# Patient Record
Sex: FEMALE | Race: WHITE | NOT HISPANIC OR LATINO | URBAN - METROPOLITAN AREA
[De-identification: names, ages, dates, MRNs, and addresses within clinical notes are randomized per-mention and may not be internally consistent; named-entity substitution may affect disease eponyms.]

---

## 2023-10-17 ENCOUNTER — TELEPHONE (OUTPATIENT)
Dept: BEHAVIORAL HEALTH | Facility: CLINIC | Age: 43
End: 2023-10-17

## 2023-12-04 ENCOUNTER — TELEPHONE (OUTPATIENT)
Dept: BEHAVIORAL HEALTH | Facility: CLINIC | Age: 43
End: 2023-12-04

## 2023-12-04 ENCOUNTER — DOCUMENTATION (OUTPATIENT)
Dept: BEHAVIORAL HEALTH | Facility: CLINIC | Age: 43
End: 2023-12-04

## 2023-12-04 DIAGNOSIS — F32.A DEPRESSION, UNSPECIFIED DEPRESSION TYPE: ICD-10-CM

## 2023-12-04 DIAGNOSIS — F43.10 PTSD (POST-TRAUMATIC STRESS DISORDER): ICD-10-CM

## 2023-12-04 DIAGNOSIS — F39 UNSPECIFIED MOOD (AFFECTIVE) DISORDER (CMS-HCC): ICD-10-CM

## 2023-12-04 RX ORDER — PRAZOSIN HYDROCHLORIDE 1 MG/1
1 CAPSULE ORAL NIGHTLY
Qty: 90 CAPSULE | Refills: 0 | Status: SHIPPED | OUTPATIENT
Start: 2023-12-04 | End: 2024-01-04 | Stop reason: SDUPTHER

## 2023-12-04 RX ORDER — ESCITALOPRAM OXALATE 20 MG/1
20 TABLET ORAL DAILY
COMMUNITY
End: 2023-12-04 | Stop reason: SDUPTHER

## 2023-12-04 RX ORDER — LAMOTRIGINE 100 MG/1
1 TABLET ORAL 2 TIMES DAILY
COMMUNITY
End: 2023-12-04 | Stop reason: SDUPTHER

## 2023-12-04 RX ORDER — ESCITALOPRAM OXALATE 20 MG/1
20 TABLET ORAL DAILY
Qty: 90 TABLET | Refills: 0 | Status: SHIPPED | OUTPATIENT
Start: 2023-12-04 | End: 2024-01-04 | Stop reason: SDUPTHER

## 2023-12-04 RX ORDER — ESCITALOPRAM OXALATE 20 MG/1
20 TABLET ORAL DAILY
Status: CANCELLED
Start: 2023-12-04

## 2023-12-04 RX ORDER — LAMOTRIGINE 100 MG/1
100 TABLET ORAL 2 TIMES DAILY
Qty: 180 TABLET | Refills: 0 | Status: SHIPPED | OUTPATIENT
Start: 2023-12-04 | End: 2024-01-04 | Stop reason: SDUPTHER

## 2023-12-04 NOTE — PROGRESS NOTES
Non billable note : patient called and requested refills on lamotrigine and prazosin and escitalopram . Sent med orders after chart revew [facility]

## 2024-01-01 PROBLEM — R87.619 ABNORMAL PAP SMEAR OF CERVIX: Status: ACTIVE | Noted: 2024-01-01

## 2024-01-01 PROBLEM — F43.10 PTSD (POST-TRAUMATIC STRESS DISORDER): Status: ACTIVE | Noted: 2024-01-01

## 2024-01-01 PROBLEM — Z98.84 BARIATRIC SURGERY STATUS: Status: ACTIVE | Noted: 2024-01-01

## 2024-01-01 PROBLEM — E55.9 VITAMIN D DEFICIENCY: Status: ACTIVE | Noted: 2024-01-01

## 2024-01-01 PROBLEM — G47.19 EXCESSIVE DAYTIME SLEEPINESS: Status: ACTIVE | Noted: 2024-01-01

## 2024-01-01 PROBLEM — F32.A DEPRESSION: Status: ACTIVE | Noted: 2024-01-01

## 2024-01-01 PROBLEM — R10.9 ABDOMINAL PAIN: Status: ACTIVE | Noted: 2024-01-01

## 2024-01-01 PROBLEM — R74.8 LOW SERUM HDL: Status: ACTIVE | Noted: 2024-01-01

## 2024-01-01 PROBLEM — F39 MOOD DISORDER (CMS-HCC): Status: ACTIVE | Noted: 2024-01-01

## 2024-01-01 PROBLEM — T50.A95A LOCAL REACTION TO TETANUS VACCINE: Status: ACTIVE | Noted: 2024-01-01

## 2024-01-01 PROBLEM — R79.89 ABNORMAL THYROID BLOOD TEST: Status: ACTIVE | Noted: 2024-01-01

## 2024-01-01 PROBLEM — F41.9 ANXIETY: Status: ACTIVE | Noted: 2024-01-01

## 2024-01-01 PROBLEM — R19.5 CHANGE IN STOOL: Status: ACTIVE | Noted: 2024-01-01

## 2024-01-01 PROBLEM — K62.5 RECTAL BLEEDING: Status: ACTIVE | Noted: 2024-01-01

## 2024-01-01 PROBLEM — E66.01 MORBID OBESITY (MULTI): Status: ACTIVE | Noted: 2024-01-01

## 2024-01-01 PROBLEM — J01.90 ACUTE SINUS INFECTION: Status: ACTIVE | Noted: 2024-01-01

## 2024-01-01 RX ORDER — BUPROPION HYDROCHLORIDE 300 MG/1
300 TABLET ORAL
COMMUNITY
End: 2024-01-04 | Stop reason: SDUPTHER

## 2024-01-01 RX ORDER — AMLODIPINE BESYLATE 5 MG/1
5 TABLET ORAL DAILY
COMMUNITY

## 2024-01-01 RX ORDER — PRAZOSIN HYDROCHLORIDE 1 MG/1
CAPSULE ORAL
COMMUNITY
Start: 2021-04-09 | End: 2024-05-30 | Stop reason: SDUPTHER

## 2024-01-01 RX ORDER — CYCLOBENZAPRINE HCL 10 MG
TABLET ORAL
COMMUNITY
Start: 2023-06-26 | End: 2024-05-30 | Stop reason: WASHOUT

## 2024-01-01 RX ORDER — LIDOCAINE 560 MG/1
PATCH PERCUTANEOUS; TOPICAL; TRANSDERMAL
COMMUNITY
Start: 2022-06-26 | End: 2024-05-30 | Stop reason: WASHOUT

## 2024-01-01 RX ORDER — BACLOFEN 10 MG/1
10 TABLET ORAL EVERY 12 HOURS PRN
COMMUNITY
Start: 2022-06-29

## 2024-01-01 RX ORDER — ERYTHROMYCIN 5 MG/G
OINTMENT OPHTHALMIC
COMMUNITY
Start: 2022-06-05 | End: 2024-05-30 | Stop reason: WASHOUT

## 2024-01-01 RX ORDER — DIAZEPAM 5 MG/1
TABLET ORAL
COMMUNITY
Start: 2022-06-29 | End: 2024-05-30 | Stop reason: WASHOUT

## 2024-01-01 RX ORDER — PREGABALIN 75 MG/1
75 CAPSULE ORAL 3 TIMES DAILY
COMMUNITY
Start: 2022-07-11 | End: 2024-03-14

## 2024-01-01 RX ORDER — ETODOLAC 400 MG/1
400 TABLET, FILM COATED ORAL 2 TIMES DAILY
COMMUNITY
Start: 2023-11-28

## 2024-01-04 ENCOUNTER — TELEMEDICINE (OUTPATIENT)
Dept: BEHAVIORAL HEALTH | Facility: CLINIC | Age: 44
End: 2024-01-04
Payer: COMMERCIAL

## 2024-01-04 DIAGNOSIS — F43.10 PTSD (POST-TRAUMATIC STRESS DISORDER): ICD-10-CM

## 2024-01-04 DIAGNOSIS — F32.A DEPRESSION, UNSPECIFIED DEPRESSION TYPE: ICD-10-CM

## 2024-01-04 DIAGNOSIS — F41.1 GAD (GENERALIZED ANXIETY DISORDER): ICD-10-CM

## 2024-01-04 DIAGNOSIS — F39 UNSPECIFIED MOOD (AFFECTIVE) DISORDER (CMS-HCC): ICD-10-CM

## 2024-01-04 PROCEDURE — 99214 OFFICE O/P EST MOD 30 MIN: CPT | Performed by: CLINICAL NURSE SPECIALIST

## 2024-01-04 RX ORDER — BUPROPION HYDROCHLORIDE 300 MG/1
300 TABLET ORAL
Qty: 90 TABLET | Refills: 2 | Status: SHIPPED | OUTPATIENT
Start: 2024-01-04 | End: 2024-05-30 | Stop reason: SDUPTHER

## 2024-01-04 RX ORDER — PRAZOSIN HYDROCHLORIDE 1 MG/1
1 CAPSULE ORAL NIGHTLY
Qty: 90 CAPSULE | Refills: 2 | Status: SHIPPED | OUTPATIENT
Start: 2024-01-04 | End: 2024-05-30 | Stop reason: SDUPTHER

## 2024-01-04 RX ORDER — LAMOTRIGINE 100 MG/1
100 TABLET ORAL 2 TIMES DAILY
Qty: 180 TABLET | Refills: 2 | Status: SHIPPED | OUTPATIENT
Start: 2024-01-04 | End: 2024-05-30 | Stop reason: SDUPTHER

## 2024-01-04 RX ORDER — BUSPIRONE HYDROCHLORIDE 5 MG/1
5 TABLET ORAL 2 TIMES DAILY
Qty: 180 TABLET | Refills: 2 | Status: SHIPPED | OUTPATIENT
Start: 2024-01-04 | End: 2024-05-30 | Stop reason: SDUPTHER

## 2024-01-04 RX ORDER — ESCITALOPRAM OXALATE 20 MG/1
20 TABLET ORAL DAILY
Qty: 90 TABLET | Refills: 2 | Status: SHIPPED | OUTPATIENT
Start: 2024-01-04 | End: 2024-05-30 | Stop reason: SDUPTHER
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
## 2024-02-01 ENCOUNTER — TELEMEDICINE (OUTPATIENT)
Dept: BEHAVIORAL HEALTH | Facility: CLINIC | Age: 44
End: 2024-02-01
Payer: COMMERCIAL

## 2024-02-01 DIAGNOSIS — F43.10 PTSD (POST-TRAUMATIC STRESS DISORDER): ICD-10-CM

## 2024-02-01 DIAGNOSIS — F41.9 ANXIETY: ICD-10-CM

## 2024-02-01 DIAGNOSIS — F32.A DEPRESSION, UNSPECIFIED DEPRESSION TYPE: ICD-10-CM

## 2024-02-01 DIAGNOSIS — F39 MOOD DISORDER (CMS-HCC): ICD-10-CM

## 2024-02-01 PROCEDURE — 99213 OFFICE O/P EST LOW 20 MIN: CPT | Performed by: CLINICAL NURSE SPECIALIST
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
## 2024-05-30 ENCOUNTER — TELEMEDICINE (OUTPATIENT)
Dept: BEHAVIORAL HEALTH | Facility: CLINIC | Age: 44
End: 2024-05-30
Payer: COMMERCIAL

## 2024-05-30 DIAGNOSIS — F39 UNSPECIFIED MOOD (AFFECTIVE) DISORDER (CMS-HCC): ICD-10-CM

## 2024-05-30 DIAGNOSIS — F41.1 GAD (GENERALIZED ANXIETY DISORDER): ICD-10-CM

## 2024-05-30 DIAGNOSIS — F39 MOOD DISORDER (CMS-HCC): ICD-10-CM

## 2024-05-30 DIAGNOSIS — F41.1 GENERALIZED ANXIETY DISORDER: ICD-10-CM

## 2024-05-30 DIAGNOSIS — F32.A DEPRESSION, UNSPECIFIED DEPRESSION TYPE: ICD-10-CM

## 2024-05-30 DIAGNOSIS — F43.10 PTSD (POST-TRAUMATIC STRESS DISORDER): ICD-10-CM

## 2024-05-30 DIAGNOSIS — F41.9 ANXIETY: ICD-10-CM

## 2024-05-30 PROCEDURE — 99214 OFFICE O/P EST MOD 30 MIN: CPT | Performed by: CLINICAL NURSE SPECIALIST

## 2024-05-30 RX ORDER — BUSPIRONE HYDROCHLORIDE 5 MG/1
5 TABLET ORAL 2 TIMES DAILY
Qty: 180 TABLET | Refills: 1 | Status: SHIPPED | OUTPATIENT
Start: 2024-05-30 | End: 2024-08-28

## 2024-05-30 RX ORDER — PRAZOSIN HYDROCHLORIDE 1 MG/1
1 CAPSULE ORAL NIGHTLY
Qty: 90 CAPSULE | Refills: 1 | Status: SHIPPED | OUTPATIENT
Start: 2024-05-30 | End: 2024-08-28

## 2024-05-30 RX ORDER — PRAZOSIN HYDROCHLORIDE 1 MG/1
1 CAPSULE ORAL NIGHTLY
Qty: 30 CAPSULE | Refills: 1 | Status: SHIPPED | OUTPATIENT
Start: 2024-05-30 | End: 2024-08-28

## 2024-05-30 RX ORDER — BUPROPION HYDROCHLORIDE 300 MG/1
300 TABLET ORAL
Qty: 90 TABLET | Refills: 1 | Status: SHIPPED | OUTPATIENT
Start: 2024-05-30 | End: 2024-08-28

## 2024-05-30 RX ORDER — LAMOTRIGINE 100 MG/1
100 TABLET ORAL 2 TIMES DAILY
Qty: 180 TABLET | Refills: 1 | Status: SHIPPED | OUTPATIENT
Start: 2024-05-30 | End: 2024-08-28

## 2024-05-30 RX ORDER — ESCITALOPRAM OXALATE 20 MG/1
20 TABLET ORAL DAILY
Qty: 90 TABLET | Refills: 1 | Status: SHIPPED | OUTPATIENT
Start: 2024-05-30 | End: 2024-08-28

## 2024-05-30 NOTE — PROGRESS NOTES
Outpatient Psychiatry      Subjective     Alejandra Connell, a 43 y.o. female presents as an established patient for a virtual appointment as an established patient for medication managent /outpatient psychiatry     Diagnosis:        Anxiety (300.00) (F41.9)   Depression (311) (F32.A)   Mood disorder (296.90) (F39)   PTSD (post-traumatic stress disorder) (309.81) (F43.10)       Treatment Plan/Recommendations: can adhere to 10-12 weeks and maintain appointments with other medical providers , can call  for treatment concerns   Follow-up plan was discussed with patient.    Review with patient: Treatment plan reviewed with the patient.  Medication risks/benefit reviewed with the patient    HPI:  She has had a stable mood and she can manage her anxiety   [facility] therapist del[facility] ([facility] ) , and this has been helpful    She is working      She has osteoarthritis   She is on leave for work right now   She is able to express her thoughts and feelings , coping skills and insight are good   Support provided       Record Review: brief     Vitals:  There were no vitals filed for this visit.    Johanna Russo, APRN-CNS

## 2024-06-13 PROBLEM — F41.1 GAD (GENERALIZED ANXIETY DISORDER): Status: ACTIVE | Noted: 2024-06-13

## 2024-10-04 ENCOUNTER — APPOINTMENT (OUTPATIENT)
Dept: BEHAVIORAL HEALTH | Facility: CLINIC | Age: 44
End: 2024-10-04
Payer: COMMERCIAL

## 2024-10-23 ENCOUNTER — APPOINTMENT (OUTPATIENT)
Dept: BEHAVIORAL HEALTH | Facility: CLINIC | Age: 44
End: 2024-10-23

## 2024-11-12 ENCOUNTER — APPOINTMENT (OUTPATIENT)
Dept: BEHAVIORAL HEALTH | Facility: CLINIC | Age: 44
End: 2024-11-12

## 2024-11-12 DIAGNOSIS — F32.A DEPRESSION, UNSPECIFIED DEPRESSION TYPE: ICD-10-CM

## 2024-11-12 DIAGNOSIS — F41.9 ANXIETY: ICD-10-CM

## 2024-11-12 DIAGNOSIS — F43.10 PTSD (POST-TRAUMATIC STRESS DISORDER): ICD-10-CM

## 2024-11-12 DIAGNOSIS — F39 UNSPECIFIED MOOD (AFFECTIVE) DISORDER (CMS-HCC): ICD-10-CM

## 2024-11-12 DIAGNOSIS — F41.1 GENERALIZED ANXIETY DISORDER: ICD-10-CM

## 2024-11-12 DIAGNOSIS — F33.0 MILD RECURRENT MAJOR DEPRESSION (CMS-HCC): ICD-10-CM

## 2024-11-12 PROCEDURE — 99212 OFFICE O/P EST SF 10 MIN: CPT | Performed by: CLINICAL NURSE SPECIALIST

## 2024-11-12 RX ORDER — PRAZOSIN HYDROCHLORIDE 1 MG/1
1 CAPSULE ORAL NIGHTLY
Qty: 30 CAPSULE | Refills: 5 | Status: SHIPPED | OUTPATIENT
Start: 2024-11-12 | End: 2025-02-10

## 2024-11-12 RX ORDER — LAMOTRIGINE 100 MG/1
100 TABLET ORAL 2 TIMES DAILY
Qty: 60 TABLET | Refills: 5 | Status: SHIPPED | OUTPATIENT
Start: 2024-11-12 | End: 2024-12-12

## 2024-11-12 RX ORDER — BUPROPION HYDROCHLORIDE 300 MG/1
300 TABLET ORAL
Qty: 30 TABLET | Refills: 5 | Status: SHIPPED | OUTPATIENT
Start: 2024-11-12 | End: 2024-12-12

## 2024-11-12 RX ORDER — ESCITALOPRAM OXALATE 20 MG/1
20 TABLET ORAL DAILY
Qty: 30 TABLET | Refills: 5 | Status: SHIPPED | OUTPATIENT
Start: 2024-11-12 | End: 2024-12-12

## 2024-11-12 NOTE — PROGRESS NOTES
Outpatient Psychiatry      Subjective   Alejandra Connell, a 44 y.o. female,presents as an established patient for an audio and video virtual appointment as an established patient for medication managent /outpatient psychiatry  Virtual or Telephone Consent    An interactive audio and video telecommunication system which permits real time communications between the patient (at the originating site) and provider (at the distant site) was utilized to provide this telehealth service.   Verbal consent was requested and obtained from Alejandra Connell on this date, 11/12/24 for a telehealth visit.         Diagnosis:        Generalized anxiety disorder F41.1 mild    mild recurrent major Depression F 33.0    Mood disorder (296.90) (F39) stable    PTSD (post-traumatic stress disorder) (309.81) (F43.10) mild       Treatment Plan/Recommendations: can adhere to 10-12 weeks and maintain appointments with other medical providers , can call  for treatment concerns   Follow-up plan was discussed with patient.     Review with patient: Treatment plan reviewed with the patient.  Medication risks/benefit reviewed with the patient     HPI:  She has had a stable mood and she can manage her anxiety, gets triggers to anxiety from past trauma history   She says she has stressors and she is trying to focus on positive things     She has osteoarthritis    She is able to express her thoughts and feelings , coping skills and insight are good   Support provided       Psych ros and medical ros as noted above     Patient Active Problem List   Diagnosis    Abdominal pain    Abnormal Pap smear of cervix    Abnormal thyroid blood test    Acute sinus infection    Anxiety    Bariatric surgery status    Change in stool    Excessive daytime sleepiness    Local reaction to tetanus vaccine    Low serum HDL    Depression    Unspecified mood (affective) disorder (CMS-HCC)    Morbid obesity (Multi)    PTSD (post-traumatic stress disorder)    Rectal  bleeding    Vitamin D deficiency    SELMA (generalized anxiety disorder)     Current Outpatient Medications:     amLODIPine (Norvasc) 5 mg tablet, Take 1 tablet (5 mg) by mouth once daily., Disp: , Rfl:     baclofen (Lioresal) 10 mg tablet, Take 1 tablet (10 mg) by mouth every 12 hours if needed., Disp: , Rfl:     buPROPion XL (Wellbutrin XL) 300 mg 24 hr tablet, Take 1 tablet (300 mg) by mouth once daily in the morning. Take before meals., Disp: 90 tablet, Rfl: 1    busPIRone (Buspar) 5 mg tablet, Take 1 tablet (5 mg) by mouth 2 times a day., Disp: 180 tablet, Rfl: 1    diclofenac sodium 1 % kit, APPLY 2 GRAMS TO AFFECTED AREA FOUR TIMES DAILY, Disp: , Rfl:     escitalopram (Lexapro) 20 mg tablet, Take 1 tablet (20 mg) by mouth once daily., Disp: 90 tablet, Rfl: 1    etodolac (Lodine) 400 mg tablet, Take 1 tablet (400 mg) by mouth twice a day., Disp: , Rfl:     lamoTRIgine (LaMICtal) 100 mg tablet, Take 1 tablet (100 mg) by mouth 2 times a day., Disp: 180 tablet, Rfl: 1    prazosin (Minipress) 1 mg capsule, Take 1 capsule (1 mg) by mouth once daily at bedtime., Disp: 90 capsule, Rfl: 1    prazosin (Minipress) 1 mg capsule, Take 1 capsule (1 mg) by mouth once daily at bedtime., Disp: 30 capsule, Rfl: 1    pregabalin (Lyrica) 75 mg capsule, Take 1 capsule (75 mg) by mouth 3 times a day., Disp: , Rfl:   Medical History:  No past medical history on file.  Surgical History:  Past Surgical History:   Procedure Laterality Date    CT ANGIO NECK  9/26/2019    CT NECK ANGIO W AND WO IV CONTRAST 9/26/2019 [facility]    CT HEAD ANGIO W AND WO IV CONTRAST  9/26/2019    CT HEAD ANGIO W AND WO IV CONTRAST 9/26/2019 [facility]     Family History:  No family history on file.  Social History:  Social History     Socioeconomic History    Marital status: Single     Spouse name: Not on file    Number of children: Not on file    Years of education: Not on file    Highest education level: Not on file   Occupational  History    Not on file   Tobacco Use    Smoking status: Every Day     Types: Cigarettes    Smokeless tobacco: Never   Substance and Sexual Activity    Alcohol use: Not Currently    Drug use: Not Currently    Sexual activity: Not on file   Other Topics Concern    Not on file   Social History Narrative    Not on file     Social Drivers of Health     Financial Resource Strain: High Risk (11/27/2023)    Received from [facility]    Overall Financial Resource Strain (CARDIA)     Difficulty of Paying Living Expenses: Very hard   Food Insecurity: No Food Insecurity (5/10/2024)    Received from [facility]    Hunger Vital Sign     Worried About Running Out of Food in the Last Year: Never true     Ran Out of Food in the Last Year: Never true   Transportation Needs: No Transportation Needs (5/10/2024)    Received from [facility]    PRAPARE - Transportation     Lack of Transportation (Medical): No     Lack of Transportation (Non-Medical): No   Physical Activity: Inactive (11/27/2023)    Received from [facility]    Exercise Vital Sign     Days of Exercise per Week: 0 days     Minutes of Exercise per Session: 0 min   Stress: Stress Concern Present (11/27/2023)    Received from [facility]    [employer] [place] [employer] - Occupational Stress Questionnaire     Feeling of Stress : To some extent   Social Connections: Socially Isolated (11/27/2023)    Received from [facility]    Social Connection and Isolation Panel [NHANES]     Frequency of Communication with Friends and Family: Once a week     Frequency of Social Gatherings with Friends and Family: Once a week     Attends Lutheran Services: More than 4 times per year     Active Member of Clubs or Organizations: No     Attends Club or Organization Meetings: Never     Marital Status: Never    Intimate Partner Violence: Not  on file   Housing Stability: Low Risk  (5/10/2024)    Received from [facility]    Housing Stability Vital Sign     Unable to Pay for Housing in the Last Year: No     Number of Places Lived in the Last Year: 2     Unstable Housing in the Last Year: No     Vitals:  There were no vitals filed for this visit.    Johanna Russo, APRN-CNS

## 2025-05-30 ENCOUNTER — DOCUMENTATION (OUTPATIENT)
Dept: BEHAVIORAL HEALTH | Facility: CLINIC | Age: 45
End: 2025-05-30

## 2025-05-30 DIAGNOSIS — F43.10 PTSD (POST-TRAUMATIC STRESS DISORDER): ICD-10-CM

## 2025-05-30 RX ORDER — PRAZOSIN HYDROCHLORIDE 1 MG/1
1 CAPSULE ORAL NIGHTLY
Qty: 30 CAPSULE | Refills: 1 | Status: SHIPPED | OUTPATIENT
Start: 2025-05-30 | End: 2025-06-29

## 2025-05-30 NOTE — PROGRESS NOTES
Nonbillable note : patient requested refill on prazosin , sent one month , one refill and asked staff to schedule her to be seen between now and 2 months for an appointment . [facility]

## 2025-06-06 ENCOUNTER — APPOINTMENT (OUTPATIENT)
Dept: BEHAVIORAL HEALTH | Facility: CLINIC | Age: 45
End: 2025-06-06

## 2025-06-06 NOTE — PROGRESS NOTES
Outpatient Psychiatry      Subjective   Alejandra Connell, a 44 y.o. female,   Assessment/Plan   Diagnosis: Problem List[1]    Treatment Goals:  Specify outcomes written in observable, behavioral terms:   {treatment goals:31426}    Treatment Plan/Recommendations: ***  Follow-up plan for depression {was/was not:19833} discussed with patient.    Referral to:  {referral for:46079}    Review with patient: {patient educ:42602}    Reason for Visit:       Reason:  She has been experiencing ***.    HPI:    Current Medications:  Current Medications[2]  Medical History:  Medical History[3]  Surgical History:  Surgical History[4]  Family History:  Family History[5]  Social History:  Social History     Socioeconomic History    Marital status: Single     Spouse name: Not on file    Number of children: Not on file    Years of education: Not on file    Highest education level: Not on file   Occupational History    Not on file   Tobacco Use    Smoking status: Every Day     Types: Cigarettes    Smokeless tobacco: Never   Substance and Sexual Activity    Alcohol use: Not Currently    Drug use: Not Currently    Sexual activity: Not on file   Other Topics Concern    Not on file   Social History Narrative    Not on file     Social Drivers of Health     Financial Resource Strain: High Risk (11/27/2023)    Received from [facility]    Overall Financial Resource Strain (CARDIA)     Difficulty of Paying Living Expenses: Very hard   Food Insecurity: No Food Insecurity (5/10/2024)    Received from [facility]    Hunger Vital Sign     Worried About Running Out of Food in the Last Year: Never true     Ran Out of Food in the Last Year: Never true   Transportation Needs: No Transportation Needs (5/10/2024)    Received from [facility]    PRAPARE - Transportation     Lack of Transportation (Medical): No     Lack of Transportation (Non-Medical): No   Physical Activity: Inactive (11/27/2023)    Received from [facility]    Exercise  "Vital Sign     Days of Exercise per Week: 0 days     Minutes of Exercise per Session: 0 min   Stress: Stress Concern Present (11/27/2023)    Received from [facility]    [place] [place] [employer] Occupational [employer] - Occupational Stress Questionnaire     Feeling of Stress : To some extent   Social Connections: Socially Isolated (11/27/2023)    Received from [facility]    Social Connection and Isolation Panel [NHANES]     Frequency of Communication with Friends and Family: Once a week     Frequency of Social Gatherings with Friends and Family: Once a week     Attends Jewish Services: More than 4 times per year     Active Member of Clubs or Organizations: No     Attends Club or Organization Meetings: Never     Marital Status: Never    Intimate Partner Violence: Not on file   Housing Stability: Low Risk  (5/10/2024)    Received from [facility]    Housing Stability Vital Sign     Unable to Pay for Housing in the Last Year: No     Number of Places Lived in the Last Year: 2     Unstable Housing in the Last Year: No       Additional historical information includes: ***    Record Review: {brief/mod/extensive:04720}     Medical Review Of Systems:  {ros; complete:79463}    Psychiatric Review Of Systems:     Psych Review of Symptoms    Depressive Symptoms:{Depressive symptoms:02576::\"N/A\"}  Manic Symptoms:{Manic Symptoms:60384}  Anxiety Symptoms:{Anxiety Symptoms:51678}  Disordered Eating Symptoms:{Disordered Eating Symptoms:33624}  Inattentive Symptoms:{Inattentive Symptoms:22166}   Hyperactive/Impulsive Symptoms:{Hyperactive/Impulsive Symp[toms:57123}  Oppositional Defiant Symptoms:{Oppositional Defiant Symptoms:83084}  Trauma Symptoms:{Trauma Symptoms:69160}  Conduct Issues:{Conduct Issues:87492}  Psychotic Symptoms:{Psychotic Symptoms:04225}  Developmental Concerns:{Developmental Concerns:94014}  Other Symptoms/Concerns:{Other Symptoms/Concerns:52600}  Delirium/Altered Mental Status " Symptoms:{Delirium/Altered Mental Status Symptoms:65275}       Objective   Mental Status Exam:       Other Objective Information:  ***  Vitals:  There were no vitals filed for this visit.        Time spent in therapy ***  Summary of Psychotherapy component: ***  Total time spent ***    Johanna Russo, APRN-CNS         [1]   Patient Active Problem List  Diagnosis    Abdominal pain    Abnormal Pap smear of cervix    Abnormal thyroid blood test    Acute sinus infection    Anxiety    Bariatric surgery status    Change in stool    Excessive daytime sleepiness    Local reaction to tetanus vaccine    Low serum HDL    Depression    Unspecified mood (affective) disorder    Morbid obesity (Multi)    PTSD (post-traumatic stress disorder)    Rectal bleeding    Vitamin D deficiency    SELMA (generalized anxiety disorder)   [2]   Current Outpatient Medications:     amLODIPine (Norvasc) 5 mg tablet, Take 1 tablet (5 mg) by mouth once daily., Disp: , Rfl:     baclofen (Lioresal) 10 mg tablet, Take 1 tablet (10 mg) by mouth every 12 hours if needed., Disp: , Rfl:     buPROPion XL (Wellbutrin XL) 300 mg 24 hr tablet, Take 1 tablet (300 mg) by mouth once daily in the morning. Take before meals., Disp: 30 tablet, Rfl: 5    diclofenac sodium 1 % kit, APPLY 2 GRAMS TO AFFECTED AREA FOUR TIMES DAILY, Disp: , Rfl:     escitalopram (Lexapro) 20 mg tablet, Take 1 tablet (20 mg) by mouth once daily., Disp: 30 tablet, Rfl: 5    etodolac (Lodine) 400 mg tablet, Take 1 tablet (400 mg) by mouth twice a day., Disp: , Rfl:     lamoTRIgine (LaMICtal) 100 mg tablet, Take 1 tablet (100 mg) by mouth 2 times a day., Disp: 60 tablet, Rfl: 5    prazosin (Minipress) 1 mg capsule, Take 1 capsule (1 mg) by mouth once daily at bedtime., Disp: 30 capsule, Rfl: 5    prazosin (Minipress) 1 mg capsule, Take 1 capsule (1 mg) by mouth once daily at bedtime. Must have an appointment within 2 months, Disp: 30 capsule, Rfl: 1    pregabalin (Lyrica) 75 mg capsule, Take 1  capsule (75 mg) by mouth 3 times a day., Disp: , Rfl:   [3] No past medical history on file.  [4]   Past Surgical History:  Procedure Laterality Date    CT ANGIO NECK  9/26/2019    CT NECK ANGIO W AND WO IV CONTRAST 9/26/2019 [facility]    CT HEAD ANGIO W AND WO IV CONTRAST  9/26/2019    CT HEAD ANGIO W AND WO IV CONTRAST 9/26/2019 [facility]   [5] No family history on file.

## 2025-07-01 ENCOUNTER — APPOINTMENT (OUTPATIENT)
Dept: BEHAVIORAL HEALTH | Facility: CLINIC | Age: 45
End: 2025-07-01

## 2025-07-01 DIAGNOSIS — F33.0 MILD RECURRENT MAJOR DEPRESSION: ICD-10-CM

## 2025-07-01 DIAGNOSIS — F41.1 GAD (GENERALIZED ANXIETY DISORDER): ICD-10-CM

## 2025-07-01 DIAGNOSIS — F39 MOOD DISORDER: ICD-10-CM

## 2025-07-01 DIAGNOSIS — F43.10 PTSD (POST-TRAUMATIC STRESS DISORDER): ICD-10-CM

## 2025-07-01 PROCEDURE — 99214 OFFICE O/P EST MOD 30 MIN: CPT | Performed by: CLINICAL NURSE SPECIALIST

## 2025-07-01 RX ORDER — BUPROPION HYDROCHLORIDE 300 MG/1
300 TABLET ORAL
Qty: 30 TABLET | Refills: 5 | Status: SHIPPED | OUTPATIENT
Start: 2025-07-01 | End: 2025-07-31

## 2025-07-01 RX ORDER — BUPROPION HYDROCHLORIDE 300 MG/1
300 TABLET ORAL
Qty: 30 TABLET | Refills: 5 | Status: SHIPPED | OUTPATIENT
Start: 2025-07-01 | End: 2025-07-01 | Stop reason: SDUPTHER

## 2025-07-01 RX ORDER — PRAZOSIN HYDROCHLORIDE 2 MG/1
2 CAPSULE ORAL NIGHTLY
Qty: 30 CAPSULE | Refills: 5 | Status: SHIPPED | OUTPATIENT
Start: 2025-07-01 | End: 2026-07-01

## 2025-07-01 RX ORDER — SERTRALINE HYDROCHLORIDE 50 MG/1
50 TABLET, FILM COATED ORAL DAILY
Qty: 30 TABLET | Refills: 5 | Status: SHIPPED | OUTPATIENT
Start: 2025-07-01 | End: 2025-07-31

## 2025-07-01 NOTE — PROGRESS NOTES
Outpatient Psychiatry      Subjective   Alejandra Connell, a 44 y.o. female, presents as an established patient for an audio and video virtual appointment as an established patient for medication managent /outpatient psychiatry  Virtual or Telephone Consent     An interactive audio and video telecommunication system which permits real time communications between the patient (at the originating site) and provider (at the distant site) was utilized to provide this telehealth service.   Verbal consent was requested and obtained from Alejandra Connell on this date, 7/1/25 for a telehealth visit.        Diagnosis:        Generalized anxiety disorder F41.1 mild primary diagnoses   mild recurrent major Depression F 33.secondary diagnoses   Mood disorder (296.90) (F39) stable    PTSD (post-traumatic stress disorder) (309.81) (F43.10) mild       Treatment Plan/Recommendations: can adhere to 10-12 weeks and maintain appointments with other medical providers , can call  for treatment concerns   Follow-up plan was discussed with patient.     Review with patient: Treatment plan reviewed with the patient.  Medication risks/benefit reviewed with the patient     HPI:  She has had a stable mood and she can manage her anxiety, gets triggers to anxiety from past trauma history , she is getting nightmares , she says she broke her toe recently kicking a wall when she woke up from a nightmare   She says she has stressors  She says she is constantly sad  She says she gained back the weight she lost   She says she is afraid to do anything in [place] , she is afraid of violence   She is very anxious   She is in a new relationship and she considers this a healthy relationship     She has osteoarthritis    She is able to express her thoughts and feelings    Support provided       Psych ros and medical ros as noted above       Social History     Socioeconomic History    Marital status: Single     Spouse name: Not on file    Number of  children: Not on file    Years of education: Not on file    Highest education level: Not on file   Occupational History    Not on file   Tobacco Use    Smoking status: Some Days     Current packs/day: 0.25     Average packs/day: 0.3 packs/day for 30.0 years (7.5 ttl pk-yrs)     Types: Cigarettes    Smokeless tobacco: Never    Tobacco comments:     Currently in quit now program   Substance and Sexual Activity    Alcohol use: Not Currently    Drug use: Not Currently    Sexual activity: Yes     Partners: Male     Birth control/protection: I.U.D.   Other Topics Concern    Not on file   Social History Narrative    Not on file     Social Drivers of Health     Financial Resource Strain: High Risk (11/27/2023)    Received from [facility]    Overall Financial Resource Strain (CARDIA)     Difficulty of Paying Living Expenses: Very hard   Food Insecurity: No Food Insecurity (5/10/2024)    Received from [facility]    Hunger Vital Sign     Worried About Running Out of Food in the Last Year: Never true     Ran Out of Food in the Last Year: Never true   Transportation Needs: No Transportation Needs (5/10/2024)    Received from [facility]    PRAPARE - Transportation     Lack of Transportation (Medical): No     Lack of Transportation (Non-Medical): No   Physical Activity: Inactive (11/27/2023)    Received from [facility]    Exercise Vital Sign     Days of Exercise per Week: 0 days     Minutes of Exercise per Session: 0 min   Stress: Stress Concern Present (11/27/2023)    Received from [facility]    [place] [employer] - Occupational Stress Questionnaire     Feeling of Stress : To some extent   Social Connections: Socially Isolated (11/27/2023)    Received from [facility]    Social Connection and Isolation Panel [NHANES]     Frequency of Communication with Friends and Family: Once a week     Frequency of Social Gatherings with Friends and Family: Once a week     Attends  Confucianism Services: More than 4 times per year     Active Member of Clubs or Organizations: No     Attends Club or Organization Meetings: Never     Marital Status: Never    Intimate Partner Violence: Not on file   Housing Stability: Low Risk  (5/10/2024)    Received from [facility]    Housing Stability Vital Sign     Unable to Pay for Housing in the Last Year: No     Number of Places Lived in the Last Year: 2     Unstable Housing in the Last Year: No     Record Review: brief    Vitals:  There were no vitals filed for this visit.    Johanna Rsuso, APRN-CNS

## 2025-07-11 DIAGNOSIS — F39 UNSPECIFIED MOOD (AFFECTIVE) DISORDER: ICD-10-CM

## 2025-07-12 RX ORDER — LAMOTRIGINE 100 MG/1
100 TABLET ORAL 2 TIMES DAILY
Qty: 60 TABLET | Refills: 0 | Status: SHIPPED | OUTPATIENT
Start: 2025-07-12 | End: 2025-08-11

## 2025-08-13 DIAGNOSIS — F39 UNSPECIFIED MOOD (AFFECTIVE) DISORDER: ICD-10-CM

## 2025-08-15 ENCOUNTER — DOCUMENTATION (OUTPATIENT)
Dept: BEHAVIORAL HEALTH | Facility: CLINIC | Age: 45
End: 2025-08-15

## 2025-08-15 DIAGNOSIS — F39 UNSPECIFIED MOOD (AFFECTIVE) DISORDER: ICD-10-CM

## 2025-08-15 RX ORDER — LAMOTRIGINE 100 MG/1
100 TABLET ORAL 2 TIMES DAILY
Qty: 60 TABLET | Refills: 0 | OUTPATIENT
Start: 2025-08-15 | End: 2025-09-14

## 2025-08-15 RX ORDER — LAMOTRIGINE 100 MG/1
100 TABLET ORAL 2 TIMES DAILY
Qty: 60 TABLET | Refills: 4 | Status: SHIPPED | OUTPATIENT
Start: 2025-08-15 | End: 2025-09-14